# Patient Record
(demographics unavailable — no encounter records)

---

## 2025-02-12 NOTE — DISCUSSION/SUMMARY
[FreeTextEntry1] :  #1. Will schedule lung function testing in near future to assess lung function. Pt to arrange for old studies. #2. Continue Breo 200 and Incruse. She did not tolerate Trelegy 200. #3. Diet and exercise for weight loss. #4. SOBOE is likely at least somewhat related to weight or deconditioning. #5. Albuterol as needed. Consider Daliresp if has frequent exac since she has been on multiple steroids in the past. #6. Pt denies significant sleep complaints.  #7. Thoracic surgery f/u for nodules and Zepher valves. #8. S/p Covid vaccines and boosters. #9. Consider A1AT levels given significant FH of COPD #10. F/u in 2 months with PFTs and old records. #11. Pt currently not on home O2.   The patient expressed understanding and agreement with the above recommendations/plan and accepts responsibility to be compliant with recommended testing, therapies, and f/u visits. All relevant questions and concerns were addressed.

## 2025-02-12 NOTE — CONSULT LETTER
[FreeTextEntry3] : Jonathan Nazario MD, FCCP, D. [facility] at [place]

## 2025-02-12 NOTE — END OF VISIT
[TextEntry] : Discussed with pt at length regarding COPD, abnormal CT, nodules, soboe,smoking hx; reviewed w/u with pt as above.

## 2025-02-12 NOTE — HISTORY OF PRESENT ILLNESS
[TextBox_4] : Former smoker of < 1 ppd x 40 years, quit 2010. Use NRT. On Breo 200 and Incruse for COPD and prn Xopenex. Has not tried Daliresp. Patient c/o SOBOE and chronic cough but is otherwise without associated respiratory complaints.  S/p Zepher valves with cough since. Follows with CT surgery (Dr. Gutierrez at [facility]) for COPD and Zepher valves. Followed with Dr. Hills for pulmonary. She has completed pulm rehab in the past but wants to try again. She has thought about possible transplant in the past and will continue to follow with [facility]. On prn diuretics for b/l edema. [FreeTextEntry9] : Chest CT [FreeTextEntry8] : stable b/l nodules c/w priors with resolution of prior infiltrate [TextEntry] : Chest CT from 4/11/24 revealed stable changes c/w prior.

## 2025-02-12 NOTE — HISTORY OF PRESENT ILLNESS
[TextBox_4] : Former smoker of < 1 ppd x 40 years, quit 2010. Use NRT. On Breo 200 and Incruse for COPD and prn Xopenex. Has not tried Daliresp. Patient c/o SOBOE and chronic cough but is otherwise without associated respiratory complaints.  S/p Zepher valves with cough since. Follows with CT surgery (Dr. Gutierrez at [facility]) for COPD and Zepher valves. Followed with Dr. Hills for pulmonary. She has completed pulm rehab in the past but wants to try again. She has thought about possible transplant in the past and will continue to follow with [facility]. On prn diuretics for b/l edema. [FreeTextEntry9] : Chest CT [FreeTextEntry8] : stable b/l nodules c/w priors with resolution of prior infiltrate [TextEntry] : Chest CT from 4/11/24 revealed stable changes c/w prior.

## 2025-02-12 NOTE — CONSULT LETTER
[FreeTextEntry3] : Jonathan Nazario MD, FCCP, D. [facility] at [place]